# Patient Record
Sex: MALE | Race: WHITE | Employment: UNEMPLOYED | URBAN - METROPOLITAN AREA
[De-identification: names, ages, dates, MRNs, and addresses within clinical notes are randomized per-mention and may not be internally consistent; named-entity substitution may affect disease eponyms.]

---

## 2017-02-27 PROBLEM — R09.81 NASAL CONGESTION: Status: ACTIVE | Noted: 2017-02-27

## 2017-02-27 PROBLEM — R43.9 TASTE DISORDER: Status: ACTIVE | Noted: 2017-02-27

## 2017-03-22 PROBLEM — R09.81 NASAL CONGESTION: Status: RESOLVED | Noted: 2017-02-27 | Resolved: 2017-03-22

## 2017-03-22 PROBLEM — R43.9 TASTE DISORDER: Status: RESOLVED | Noted: 2017-02-27 | Resolved: 2017-03-22

## 2017-06-01 PROBLEM — K51.018: Status: ACTIVE | Noted: 2017-06-01

## 2019-09-16 PROCEDURE — 84402 ASSAY OF FREE TESTOSTERONE: CPT | Performed by: FAMILY MEDICINE

## 2019-09-16 PROCEDURE — 84403 ASSAY OF TOTAL TESTOSTERONE: CPT | Performed by: FAMILY MEDICINE

## 2019-09-16 PROCEDURE — 36415 COLL VENOUS BLD VENIPUNCTURE: CPT | Performed by: FAMILY MEDICINE

## 2020-01-27 PROBLEM — R05.3 CHRONIC COUGH: Status: ACTIVE | Noted: 2020-01-27

## 2020-01-27 PROBLEM — M62.830 MUSCLE SPASM OF BACK: Status: ACTIVE | Noted: 2020-01-27

## 2020-01-27 PROBLEM — R25.2 LEG CRAMP: Status: ACTIVE | Noted: 2020-01-27

## 2020-01-27 PROBLEM — M54.50 CHRONIC RIGHT-SIDED LOW BACK PAIN WITHOUT SCIATICA: Status: ACTIVE | Noted: 2020-01-27

## 2020-01-27 PROBLEM — G89.29 CHRONIC RIGHT-SIDED LOW BACK PAIN WITHOUT SCIATICA: Status: ACTIVE | Noted: 2020-01-27

## 2020-06-25 PROBLEM — K21.9 GASTROESOPHAGEAL REFLUX DISEASE, ESOPHAGITIS PRESENCE NOT SPECIFIED: Status: ACTIVE | Noted: 2020-06-25

## 2020-06-25 PROBLEM — M48.00 SPINAL STENOSIS: Status: ACTIVE | Noted: 2017-08-22

## 2020-06-25 PROBLEM — Z86.010 HISTORY OF COLON POLYPS: Status: ACTIVE | Noted: 2020-06-25

## 2020-06-25 PROBLEM — Z80.0 FAMILY HISTORY OF COLON CANCER: Status: ACTIVE | Noted: 2020-06-25

## 2020-06-25 PROBLEM — K51.90 ULCERATIVE COLITIS (HCC): Status: ACTIVE | Noted: 2017-06-01

## 2020-07-27 PROBLEM — N34.2 URETHRITIS: Status: ACTIVE | Noted: 2020-07-27

## 2021-05-06 PROBLEM — N34.2 URETHRITIS: Status: RESOLVED | Noted: 2020-07-27 | Resolved: 2021-05-06

## 2021-05-06 PROBLEM — R25.2 LEG CRAMP: Status: RESOLVED | Noted: 2020-01-27 | Resolved: 2021-05-06

## 2021-05-06 PROBLEM — G89.29 CHRONIC RIGHT-SIDED LOW BACK PAIN WITHOUT SCIATICA: Status: RESOLVED | Noted: 2020-01-27 | Resolved: 2021-05-06

## 2021-05-06 PROBLEM — M62.830 MUSCLE SPASM OF BACK: Status: RESOLVED | Noted: 2020-01-27 | Resolved: 2021-05-06

## 2021-05-06 PROBLEM — M54.50 CHRONIC RIGHT-SIDED LOW BACK PAIN WITHOUT SCIATICA: Status: RESOLVED | Noted: 2020-01-27 | Resolved: 2021-05-06

## 2024-04-04 ENCOUNTER — LAB REQUISITION (OUTPATIENT)
Dept: LAB | Facility: HOSPITAL | Age: 59
End: 2024-04-04

## 2024-04-04 DIAGNOSIS — K64.9 UNSPECIFIED HEMORRHOIDS: ICD-10-CM

## 2024-04-04 PROCEDURE — 88304 TISSUE EXAM BY PATHOLOGIST: CPT | Performed by: SURGERY

## 2024-07-26 ENCOUNTER — HOSPITAL ENCOUNTER (OUTPATIENT)
Dept: INTERVENTIONAL RADIOLOGY/VASCULAR | Facility: HOSPITAL | Age: 59
Discharge: HOME | End: 2024-07-26
Attending: INTERNAL MEDICINE | Admitting: INTERNAL MEDICINE
Payer: COMMERCIAL

## 2024-07-26 VITALS
HEART RATE: 60 BPM | WEIGHT: 234 LBS | OXYGEN SATURATION: 97 % | RESPIRATION RATE: 17 BRPM | DIASTOLIC BLOOD PRESSURE: 79 MMHG | HEIGHT: 72 IN | TEMPERATURE: 98 F | BODY MASS INDEX: 31.69 KG/M2 | SYSTOLIC BLOOD PRESSURE: 134 MMHG

## 2024-07-26 DIAGNOSIS — R94.39 ABNORMAL STRESS TEST: ICD-10-CM

## 2024-07-26 PROCEDURE — 99152 MOD SED SAME PHYS/QHP 5/>YRS: CPT | Performed by: INTERNAL MEDICINE

## 2024-07-26 PROCEDURE — 93458 L HRT ARTERY/VENTRICLE ANGIO: CPT | Performed by: INTERNAL MEDICINE

## 2024-07-26 PROCEDURE — B2151ZZ FLUOROSCOPY OF LEFT HEART USING LOW OSMOLAR CONTRAST: ICD-10-PCS | Performed by: INTERNAL MEDICINE

## 2024-07-26 PROCEDURE — 99153 MOD SED SAME PHYS/QHP EA: CPT | Performed by: INTERNAL MEDICINE

## 2024-07-26 PROCEDURE — 4A02XM4 MEASUREMENT OF CARDIAC TOTAL ACTIVITY, EXTERNAL APPROACH: ICD-10-PCS | Performed by: INTERNAL MEDICINE

## 2024-07-26 PROCEDURE — B2111ZZ FLUOROSCOPY OF MULTIPLE CORONARY ARTERIES USING LOW OSMOLAR CONTRAST: ICD-10-PCS | Performed by: INTERNAL MEDICINE

## 2024-07-26 PROCEDURE — 93799 UNLISTED CV SVC/PROCEDURE: CPT | Performed by: INTERNAL MEDICINE

## 2024-07-26 RX ORDER — ASPIRIN 81 MG/1
324 TABLET, CHEWABLE ORAL ONCE
Status: DISCONTINUED | OUTPATIENT
Start: 2024-07-26 | End: 2024-07-26 | Stop reason: HOSPADM

## 2024-07-26 RX ORDER — SODIUM CHLORIDE 9 MG/ML
INJECTION, SOLUTION INTRAVENOUS
Status: DISCONTINUED | OUTPATIENT
Start: 2024-07-27 | End: 2024-07-26 | Stop reason: HOSPADM

## 2024-07-26 RX ORDER — FLUTICASONE PROPIONATE 50 MCG
2 SPRAY, SUSPENSION (ML) NASAL DAILY PRN
COMMUNITY

## 2024-07-26 RX ORDER — LIDOCAINE HYDROCHLORIDE 10 MG/ML
INJECTION, SOLUTION EPIDURAL; INFILTRATION; INTRACAUDAL; PERINEURAL
Status: COMPLETED
Start: 2024-07-26 | End: 2024-07-26

## 2024-07-26 RX ORDER — ROSUVASTATIN CALCIUM 20 MG/1
10 TABLET, COATED ORAL NIGHTLY
Qty: 90 TABLET | Refills: 3 | Status: SHIPPED | OUTPATIENT
Start: 2024-07-26

## 2024-07-26 RX ORDER — ROSUVASTATIN CALCIUM 10 MG/1
10 TABLET, COATED ORAL NIGHTLY
Qty: 90 TABLET | Refills: 3 | Status: SHIPPED | OUTPATIENT
Start: 2024-07-26 | End: 2024-07-26

## 2024-07-26 RX ORDER — IODIXANOL 320 MG/ML
100 INJECTION, SOLUTION INTRAVASCULAR
Status: COMPLETED | OUTPATIENT
Start: 2024-07-26 | End: 2024-07-26

## 2024-07-26 RX ORDER — HEPARIN SODIUM 5000 [USP'U]/ML
INJECTION, SOLUTION INTRAVENOUS; SUBCUTANEOUS
Status: COMPLETED
Start: 2024-07-26 | End: 2024-07-26

## 2024-07-26 RX ORDER — MIDAZOLAM HYDROCHLORIDE 1 MG/ML
INJECTION INTRAMUSCULAR; INTRAVENOUS
Status: COMPLETED
Start: 2024-07-26 | End: 2024-07-26

## 2024-07-26 RX ADMIN — IODIXANOL 180 ML: 320 INJECTION, SOLUTION INTRAVASCULAR at 09:35:00

## 2024-07-26 NOTE — H&P
History of Present Illness:  Denny Brice is a a(n) 59 year old male. He has multiple complaints.  He reports a thumping painful type of palpitations for years.  A zio monitor from 2023 showed PVC's occurring at times of his palpitations. He reports PETERSON and chest tightness  with walking and cutting the grass, also for years.  He reports a sharp pain in chest with activity which occurs inconsistently,.  His Coronary calcium scan from 2023 was abnormal with calcium score = 96 (60th percentile).  He quit smoking 2015.  CAD and strokes run in the family. He admits to Dyslipidemia .  Denies Diabetes, Hypertension .  He believes that that he had a spell of Paroxysmal Atrial Fibrillation many years ago but none since. A Stress Cardiolyte Study in 7/20224 suggested inferior ischemia vs diaghram attenuation     History:       Past Medical History:   Diagnosis Date    Anemia      Elevated coronary artery calcium score 4/18/2023     Total calcium score is 96    Esophageal reflux      History of blood transfusion      Other and unspecified hyperlipidemia      Paroxysmal atrial fibrillation (HCC)      Pneumonia, organism unspecified(486)      Ulcerative colitis       DR. MOORE            Past Surgical History:   Procedure Laterality Date    COLONOSCOPY   10/19/09     TERESA SESSILEPOLYP ,DIVERTIC, HEM    COLONOSCOPY   9/19/11     teresa - tubular adenoma    COLONOSCOPY   2/28/2014     Procedure: COLONOSCOPY;  Surgeon: Darrell Wei MD;  Location:  ENDOSCOPY    DRAIN/INJECT LARGE JOINT/BURSA   12/17/2012     Procedure: HIP INJECTION (PAIN);  Surgeon: Damian Lyles MD;  Location: [facility]    ECHO HEART, FULL STRESS/REST   3/23/12 - [facility]ward     nomal    FLUOROSCOPIC GUIDANCE NEEDLE PLACEMENT   12/17/2012     Procedure: HIP INJECTION (PAIN);  Surgeon: Damian Lyles MD;  Location: [facility]    INJECTION, ANESTHETIC/STEROID, TRANSFORAMINAL EPIDURAL; LUMBAR/SACRAL, ADD'L LEVEL    2/22/2013     Procedure: TRANSFORAMINAL EPIDURAL - LUMBAR;  Surgeon: Damian Lyles MD;  [facility]    INJECTION, ANESTHETIC/STEROID, TRANSFORAMINAL EPIDURAL; LUMBAR/SACRAL, SINGLE LEVEL   2/22/2013     Procedure: TRANSFORAMINAL EPIDURAL - LUMBAR;  Surgeon: Damian Lyles MD;  Location: [facility]IN [facility]    INJECTION, ANESTHETIC/STEROID, TRANSFORAMINAL EPIDURAL; LUMBAR/SACRAL, SINGLE LEVEL   5/6/2013     Procedure: TRANSFORAMINAL EPIDURAL - LUMBAR;  Surgeon: Damian Lyles MD;  [facility]    [facility]   12/17/2012     Procedure: HIP INJECTION (PAIN);  Surgeon: Damian Lyles MD;  Location: [facility]    M-SEDAJ BY [facility] 5+ YR   2/22/2013     Procedure: TRANSFORAMINAL EPIDURAL - LUMBAR;  Surgeon: Damian Lyles MD;  [facility]    [facility]   5/6/2013     Procedure: TRANSFORAMINAL EPIDURAL - LUMBAR;  Surgeon: Damian Lyles MD;  Location: [facility]    OTHER SURGICAL HISTORY         THUMB SURGERY    UPPER GI ENDOSCOPY PERFORMED   6/6/11     bradley - esophagitis, gastritis, gerd. repeat 3 years    UPPER GI ENDOSCOPY PERFORMED   5/14/12 - bradley     reflux and gastropathy            Family History   Problem Relation Age of Onset    Stroke Mother      Cancer Mother           COLON    Stroke Father      Gastro-Intestinal Disorder Father           ULCERS    Heart Disorder Sister           myocarditis    Arthritis Sister           RA on Humira    Arrhythmia Sister           A-fib    Arthritis Sister      Gastro-Intestinal Disorder Brother           ULCERATIVE COLITIS    Heart Attack Brother      Colon Cancer Brother      Genetic Disease Neg              Allergies:     Iodine (Topical)        HIVES  Radiology Contrast *    NAUSEA ONLY  Aspirin                     Comment:GI upset- has ulcerative colitis.  Ibuprofen                    Comment:GI upset-has ulcerative colitis.  Mesalamine                Penicillins             ANAPHYLAXIS    Comment:Allergy as youth, gets \"sicker\"  Tylenol [Acetaminop*        Comment:Bloody urine.     Medications:        Continuous Infusions:        Social History:   reports that he quit smoking about 9 years ago. His smoking use included cigarettes. He started smoking about 34 years ago. He has a 25 pack-year smoking history. He has never used smokeless tobacco. He reports current alcohol use. He reports that he does not use drugs.     Review of Systems:  All systems were reviewed and are negative except as described above in HPI.     Physical Exam:        Wt Readings from Last 3 Encounters:  05/23/24 : 221 lb (100.2 kg)  03/12/24 : 233 lb (105.7 kg)  01/30/24 : 232 lb (105.2 kg)          05/23/24  1125   BP: 128/82   Pulse: 67   SpO2: 97%   Weight: 221 lb (100.2 kg)   Height: 6' 2\" (1.88 m)             General:  Appears comfortable  HEENT: No focal deficits.  Neck: No JVD, carotids 2+ no bruits.  Cardiac: Regular S1S2.  No S3, S4, rub, click.  No murmur.  Lungs: Clear to auscultation and percussion.  Abdomen: Soft, non-tender.   Extremities: No LE edema.  No clubbing or cyanosis  Neurologic: Alert and oriented, normal affect.  Skin: Warm and dry.         EKG, 5/23/2024, shows NSR and is otherwise normal         Impression:     1.  palpitations. Likely related to PVC's as demonstrated on zio monitor 2023     2. Effort-related chest pains.  Concerning for angina but occurs on inconsistent basis. Abnormal Stress Cardiolyte Study      3. PETERSON      4. Ex-smoker     5. Dyslipidemia          Recommend:       Cath, PCI  Procedure , risks discussed

## 2024-07-26 NOTE — PROCEDURES
[facility]    PATIENT'S NAME: ZAKIA ANNE   ATTENDING PHYSICIAN: Darrell Guaman M.D.   OPERATING PHYSICIAN: Darrell Guaman M.D.   PATIENT ACCOUNT#:   [id number]    LOCATION:  [address]  MEDICAL RECORD #:   [id number]       YOB: 1965  ADMISSION DATE:       07/26/2024      OPERATION DATE:  07/26/2024    CARDIAC PROCEDURE TRANSCRIPTION      CARDIAC CATHETERIZATION     PREOPERATIVE DIAGNOSIS:    POSTOPERATIVE DIAGNOSIS:    PROCEDURE PERFORMED:      CLINICAL SUMMARY:  The patient is a 59-year-old gentleman with known coronary artery disease as evidenced by an abnormal coronary calcium scan.  He has had some problems with exertional chest discomfort.  A nuclear stress test was abnormal in the inferior distribution.  He was advised to undergo cardiac catheterization.    SEDATION:  Moderate sedation was employed using 3 mg of IV Versed and 75 mcg of IV fentanyl.  A trained professional and I observed the patient from 0855 until 0914 for a total of 19 minutes.    DESCRIPTION OF PROCEDURE:  Hemodynamics were measured using a pigtail catheter advanced from the right femoral artery to the ascending aorta, across the aortic valve into the left ventricle, and pressure was measured in each of those locations.  Selective coronary angiography of the left and right coronary arteries was achieved using a 6-Tongan 4 left Edie catheter and a 6-Tongan right Edie catheter, respectively.    HEMODYNAMICS:  The aortic pressure is 129/72 with a mean of 96.  /7.    LEFT VENTRICULOGRAPHY:  The procedure was performed in the 30-degree CARR projection.  This demonstrated normal left ventricular size and systolic function.  Ejection fraction approximately 60%.    CORONARY ANGIOGRAPHY:  The left main segment of the left coronary artery is normal.  The left circumflex artery is medium to large in caliber and gives rise to a large obtuse marginal artery.  The proximal portion of  the obtuse marginal artery has an area of narrowing of approximately 65%.  The LAD is a large vessel and gives rise to a medium-sized diagonal branch.  The LAD system shows minor nonobstructive plaquing.  The right coronary artery is a medium-sized anatomically dominant vessel.  It gives rise to a small PDA and medium-sized posterior left ventricular branch.  The RCA system displays mild nonobstructive plaquing.    In light of the patient's symptoms, the abnormal stress test, and the borderline lesion seen in the obtuse marginal artery, the patient will undergo FFR examination by Dr. Zee.    IMPRESSION:    1.   Normal left ventricular diastolic function.  2.   Normal left ventricular size and systolic function.  3.   Coronary disease as described above.    Dictated By Darrell Guaman M.D.  d: 07/26/2024 09:22:37  t: 07/26/2024 11:10:55  [facility] [id number]  CJ/

## 2024-07-26 NOTE — DISCHARGE INSTRUCTIONS
Please get repeat FASTING blood work in 6 weeks at any [facility]. Ensure that blood work is completed prior to your follow up appointment.     Start taking Crestor 10 mg by mouth daily. 1st dose tonight.  Continue all other home medications as directed.    HOME CARE INSTRUCTIONS FOLLOWING CORONARY ANGIOGRAPHY,  PERIPHERAL ANGIOGRAPHY, ANGIOPLASTY (PTCA/PTA) OR INSERTION  OF STENT IN THE CORONARY, CAROTID, AND/OR PERIPHERAL ARTERIES      Activity:   DO NOT drive after the procedure. You may resume driving late the following day according to the nurse or physician’s instructions   Plan on resting and relaxing tonight and tomorrow   Resume your normal activity after 48 hours, or as instructed by your physician   Do not lift anything over 10 pounds for the next 24 hours   Avoid sexual activity for the next 24 hours   Avoid drinking alcohol for the next 24 hours   If the groin site was used, avoid repeated stair use and excessive walking for the next 24 hours   If the wrist was used, avoid bending/flexing of the wrist for the next 24 hours.       What is Normal?   A small lump at the procedure site associated with mild tenderness when touched   The procedure site may be bruised or discolored   There may be a small amount of drainage on the bandage    Special Instructions:   Drink plenty of fluids during the next 24 hours to “flush” the contrast from your system   The bandage is to remain in place for 24 hours   Keep the bandage clean and dry   DO NOT submerge the procedure site for 72 hours (no bath tubs or pools). This includes dishwashing/submersion of the wrist, if the wrist was used   After 24 hours, you must remove the bandage   You should shower after removing the bandage, and wash the procedure site gently with soap and water   If you choose to wear a bandage for a few days, make sure it remains clean and dry and that it is changed daily    When you should NOTIFY YOUR PHYSICIAN:   Bleeding can occur at the  procedure site - both on the outside of the skin and/or beneath the surface of the skin   Swelling or a large lump at the procedure site can occur, which may be accompanied by moderate to severe pain. If either of the above occurs, lie down flat. Have someone apply pressure to the procedure site with both hands, as instructed by the nurse. Hold pressure for 20 minutes and the bleeding should stop. Notify your physician of the occurrence. If the bleeding does not stop, call 911 and continue to apply pressure   If you experience signs of a fever, temperature >101 degrees, chills, infection (redness, swelling, thick yellow drainage, or a foul odor from the procedure site)   If you notice any numbness, tingling, or loss of feeling to your leg or foot for groin access   If you notice any numbness, tingling, or loss of feeling to your fingers or hand, if wrist access was utilized    If you Received a Stent:  - You will remain on an antiplatelet drug and/or aspirin. Antiplatelet medications are usually taken for six months to one year and should not be stopped unless your cardiologist directs you to do so. These medications help to prevent blockage at the stent site. If another physician or dentist asks you to stop your antiplatelet medication, you need to consult your cardiologist first. Together, your cardiologist and other physician can discuss the risks that may be involved if you are not taking the antiplatelet medication.   - If a MRI is necessary, it may be done 4-6 weeks after your procedure. Verify this with your cardiologist.  - Keep the stent card with you at all times! If you need a MRI in the future, your stent card will need to be shown to the technologist before performing the MRI. A duplicate card CANNOT be reproduced.     Other:  - You may resume your present diet, unless otherwise specified by your physician.   - You may resume all of your medications as prescribed, unless otherwise directed by your  physician. A list of your medications was provided to you at discharge.  - Please call your physician's office for a follow-up appointment. You should be seen in 2 weeks.  - Do not make any personal/business decisions and/or sign any legal documents for the next 24 hours.

## 2024-07-26 NOTE — PROGRESS NOTES
S/p LHC, right groin dressing soft C/D/I. Pt arrived via bed awake without complaints, denies pain 0/10 on pain scale.  Bedrest completed, tolerating PO intake. Up in room without complaints, voiding without complaints.  MD speaking with pt/family.  Discharge instructions given/explained to pt/family, all questions answered, verbalized understanding.  Tele dc'd, IV dc'd catheter intact, patient discharged via w/c instable condition.

## 2024-07-26 NOTE — PROCEDURES
[facility]       Denny Brice Location: Cath Lab    CSN [id number] MRN [id number]   Admission Date 7/26/2024 Procedure Date 7/26/2024   Attending Physician Marie Chan AP* Procedure Physician Nikhil Zee MD         CARDIAC CATHETERIZATION/PERCUTANEOUS CORONARY INTERVENTION      PREOPERATIVE DIAGNOSIS:  CAD  POSTOPERATIVE DIAGNOSIS:  CAD, non-obstructive  PROCEDURE PERFORMED:  iFR CX      PROCEDURE:  The patient was brought to the cardiac catheterization lab in the fasting state.  Informed consent was obtained.  Moderate sedation was employed using 1mg IV Versed and 75mcg IV fentanyl.  I directly observed the patient from 907 to 922a, watching the heart rate, blood pressure, oximetry, and rhythm.  An independent, trained observer was present throughout the procedure and assisted in the monitoring of the patient's level of consciousness and physiologic states.  Please see the [facility]atheter[facility] report in Epic for further technical details.     ACCESS/CATHETER PLACEMENT/iFR INTERVENTION:  6F right femoral.  EBU 3.5 guide used.  I used a iFR wire and normalized and zeroed in the standard fashion.  I measured several times and check normalization again.  The iFR was 0.93-0.95 (not significant).  I took final angiographic pictures to show no complications.  I used a perclose for hemostasis.       MEDICATIONS:  See nursing record.     COMPLICATIONS:  None.     IMPRESSION:    Negative iFR of CX (0.93-0.95).  Non-obstructive CAD.    RECOMMENDATIONS:   Medical management of CAD.  Follow up with Dr. JAN HERNANDEZ MD

## 2024-07-29 ENCOUNTER — PATIENT OUTREACH (OUTPATIENT)
Dept: CASE MANAGEMENT | Age: 59
End: 2024-07-29

## 2024-07-29 NOTE — PROGRESS NOTES
Hospital follow up.    Darrell Guaman MD, [facility]  Cardiology  [address]  [address]  [place] [address] [phone]  2-3 weeks    Patient will call back.  Confirmed with patient's other.    Closing encounter.